# Patient Record
Sex: FEMALE | Race: WHITE | NOT HISPANIC OR LATINO | URBAN - METROPOLITAN AREA
[De-identification: names, ages, dates, MRNs, and addresses within clinical notes are randomized per-mention and may not be internally consistent; named-entity substitution may affect disease eponyms.]

---

## 2017-01-24 ENCOUNTER — IMPORTED ENCOUNTER (OUTPATIENT)
Dept: URBAN - METROPOLITAN AREA CLINIC 38 | Facility: CLINIC | Age: 57
End: 2017-01-24

## 2017-01-24 PROBLEM — H25.813 COMBINED FORMS OF AGE-RELATED CATARACT, BILATERAL: Noted: 2017-01-24

## 2017-01-24 PROCEDURE — 92015 DETERMINE REFRACTIVE STATE: CPT

## 2017-01-24 PROCEDURE — 92014 COMPRE OPH EXAM EST PT 1/>: CPT

## 2017-01-30 ENCOUNTER — IMPORTED ENCOUNTER (OUTPATIENT)
Dept: URBAN - METROPOLITAN AREA CLINIC 38 | Facility: CLINIC | Age: 57
End: 2017-01-30

## 2017-01-30 PROBLEM — H25.813 COMBINED FORMS OF AGE-RELATED CATARACT, BILATERAL: Noted: 2017-01-30

## 2017-01-30 PROCEDURE — 92136 OPHTHALMIC BIOMETRY: CPT

## 2017-03-02 ENCOUNTER — IMPORTED ENCOUNTER (OUTPATIENT)
Dept: URBAN - METROPOLITAN AREA CLINIC 38 | Facility: CLINIC | Age: 57
End: 2017-03-02

## 2017-03-03 ENCOUNTER — IMPORTED ENCOUNTER (OUTPATIENT)
Dept: URBAN - METROPOLITAN AREA CLINIC 38 | Facility: CLINIC | Age: 57
End: 2017-03-03

## 2017-03-06 ENCOUNTER — IMPORTED ENCOUNTER (OUTPATIENT)
Dept: URBAN - METROPOLITAN AREA CLINIC 38 | Facility: CLINIC | Age: 57
End: 2017-03-06

## 2017-03-06 PROBLEM — H10.31 UNSPECIFIED ACUTE CONJUNCTIVITIS, RIGHT EYE: Noted: 2017-03-06

## 2017-03-06 PROCEDURE — 92012 INTRM OPH EXAM EST PATIENT: CPT

## 2017-03-13 ENCOUNTER — IMPORTED ENCOUNTER (OUTPATIENT)
Dept: URBAN - METROPOLITAN AREA CLINIC 38 | Facility: CLINIC | Age: 57
End: 2017-03-13

## 2017-03-13 PROCEDURE — 92136 OPHTHALMIC BIOMETRY: CPT

## 2017-04-06 ENCOUNTER — IMPORTED ENCOUNTER (OUTPATIENT)
Dept: URBAN - METROPOLITAN AREA CLINIC 38 | Facility: CLINIC | Age: 57
End: 2017-04-06

## 2017-04-07 ENCOUNTER — IMPORTED ENCOUNTER (OUTPATIENT)
Dept: URBAN - METROPOLITAN AREA CLINIC 38 | Facility: CLINIC | Age: 57
End: 2017-04-07

## 2017-04-24 ENCOUNTER — IMPORTED ENCOUNTER (OUTPATIENT)
Dept: URBAN - METROPOLITAN AREA CLINIC 38 | Facility: CLINIC | Age: 57
End: 2017-04-24

## 2017-07-31 ENCOUNTER — IMPORTED ENCOUNTER (OUTPATIENT)
Dept: URBAN - METROPOLITAN AREA CLINIC 38 | Facility: CLINIC | Age: 57
End: 2017-07-31

## 2017-07-31 PROBLEM — H26.493 POSTERIOR CAPSULE OPACITY -     OU: Noted: 2017-07-31

## 2017-07-31 PROCEDURE — 92012 INTRM OPH EXAM EST PATIENT: CPT

## 2017-12-19 ENCOUNTER — IMPORTED ENCOUNTER (OUTPATIENT)
Dept: URBAN - METROPOLITAN AREA CLINIC 38 | Facility: CLINIC | Age: 57
End: 2017-12-19

## 2017-12-19 PROBLEM — H52.4 PRESBYOPIA: Noted: 2017-12-19

## 2017-12-19 PROCEDURE — 92015 DETERMINE REFRACTIVE STATE: CPT

## 2019-01-07 ENCOUNTER — IMPORTED ENCOUNTER (OUTPATIENT)
Dept: URBAN - METROPOLITAN AREA CLINIC 38 | Facility: CLINIC | Age: 59
End: 2019-01-07

## 2019-01-07 PROBLEM — H26.493 POSTERIOR CAPSULE OPACITY -     OU: Noted: 2019-01-07

## 2019-01-07 PROBLEM — H52.4 PRESBYOPIA: Noted: 2019-01-07

## 2019-01-07 PROCEDURE — 92014 COMPRE OPH EXAM EST PT 1/>: CPT

## 2019-01-07 PROCEDURE — 92015 DETERMINE REFRACTIVE STATE: CPT

## 2020-01-20 ENCOUNTER — IMPORTED ENCOUNTER (OUTPATIENT)
Dept: URBAN - METROPOLITAN AREA CLINIC 38 | Facility: CLINIC | Age: 60
End: 2020-01-20

## 2020-01-20 PROBLEM — H43.813 VITREOUS DEGENERATION, BILATERAL: Noted: 2020-01-20

## 2020-01-20 PROBLEM — H26.493 POSTERIOR CAPSULE OPACITY -     OU: Noted: 2020-01-20

## 2020-01-20 PROCEDURE — 92014 COMPRE OPH EXAM EST PT 1/>: CPT

## 2020-01-20 PROCEDURE — 92015 DETERMINE REFRACTIVE STATE: CPT

## 2020-09-29 ENCOUNTER — IMPORTED ENCOUNTER (OUTPATIENT)
Dept: URBAN - METROPOLITAN AREA CLINIC 38 | Facility: CLINIC | Age: 60
End: 2020-09-29

## 2020-09-29 PROBLEM — H26.493 POSTERIOR CAPSULE OPACITY -     OU: Noted: 2020-09-29

## 2020-09-29 PROCEDURE — 92012 INTRM OPH EXAM EST PATIENT: CPT

## 2020-11-09 ENCOUNTER — IMPORTED ENCOUNTER (OUTPATIENT)
Dept: URBAN - METROPOLITAN AREA CLINIC 38 | Facility: CLINIC | Age: 60
End: 2020-11-09

## 2020-11-09 PROBLEM — H26.493 POSTERIOR CAPSULE OPACITY -     OU: Noted: 2020-11-09

## 2020-11-09 PROCEDURE — 66821 AFTER CATARACT LASER SURGERY: CPT

## 2021-03-11 ENCOUNTER — IMPORTED ENCOUNTER (OUTPATIENT)
Dept: URBAN - METROPOLITAN AREA CLINIC 38 | Facility: CLINIC | Age: 61
End: 2021-03-11

## 2021-03-11 PROBLEM — H26.492 POSTERIOR CAPSULE OPACITY -   OS: Noted: 2021-03-11

## 2021-03-11 PROCEDURE — 92014 COMPRE OPH EXAM EST PT 1/>: CPT

## 2021-06-24 ENCOUNTER — COMPLETE SKIN EXAM (OUTPATIENT)
Dept: URBAN - METROPOLITAN AREA CLINIC 24 | Facility: CLINIC | Age: 61
Setting detail: DERMATOLOGY
End: 2021-06-24

## 2021-06-24 DIAGNOSIS — Z85.828 PERSONAL HISTORY OF OTHER MALIGNANT NEOPLASM OF SKIN: ICD-10-CM

## 2021-06-24 DIAGNOSIS — L73.9 FOLLICULAR DISORDER, UNSPECIFIED: ICD-10-CM

## 2021-06-24 DIAGNOSIS — L90.5 SCAR CONDITIONS AND FIBROSIS OF SKIN: ICD-10-CM

## 2021-06-24 DIAGNOSIS — L81.2 FRECKLES: ICD-10-CM

## 2021-06-24 PROCEDURE — 99213 OFFICE O/P EST LOW 20 MIN: CPT

## 2021-08-02 ENCOUNTER — RX ONLY (RX ONLY)
Age: 61
End: 2021-08-02

## 2021-08-02 RX ORDER — FLUTICASONE PROPIONATE 0.5 MG/G
1 AS DIRECTED CREAM TOPICAL TWICE A DAY
Qty: 30 | Refills: 2
Start: 2021-08-02

## 2021-09-08 ENCOUNTER — IMPORTED ENCOUNTER (OUTPATIENT)
Dept: URBAN - METROPOLITAN AREA CLINIC 38 | Facility: CLINIC | Age: 61
End: 2021-09-08

## 2021-09-08 PROBLEM — H00.14 CHALAZION OF LEFT UPPER EYELID: Noted: 2021-09-08

## 2021-09-08 PROCEDURE — 92012 INTRM OPH EXAM EST PATIENT: CPT

## 2022-03-22 ENCOUNTER — IMPORTED ENCOUNTER (OUTPATIENT)
Dept: URBAN - METROPOLITAN AREA CLINIC 38 | Facility: CLINIC | Age: 62
End: 2022-03-22

## 2022-03-22 PROBLEM — H26.492 POSTERIOR CAPSULE OPACITY: Noted: 2022-03-22

## 2022-03-22 PROBLEM — H26.492 POSTERIOR CAPSULE OPACITY -   OS: Noted: 2022-03-22

## 2022-03-22 PROCEDURE — 92014 COMPRE OPH EXAM EST PT 1/>: CPT

## 2022-03-22 PROCEDURE — 92015 DETERMINE REFRACTIVE STATE: CPT

## 2022-06-03 ASSESSMENT — VISUAL ACUITY
OS_BAT: 20/100
OS_CC: 20/20
OD_BAT: 20/20-
OS_BAT: 20/20
OS_CC: 20/20
OD_BAT: 20/400
OS_CC: 20/30
OD_CC: J2
OD_CC: J1
OD_SC: J1
OD_CC: 20/20
OD_CC: 20/20
OS_CC: J1
OD_CC: J2
OS_SC: 20/20-
OS_CC: 20/20
OD_CC: 20/30-1
OS_BAT: 20/20
OD_CC: J1
OS_SC: 20/20-1
OS_CC: J3
OS_CC: 20/20
OD_CC: 20/25+1
OD_CC: 20/400
OS_CC: J1
OS_CC: 20/40
OS_SC: 20/40
OD_CC: 20/20
OD_CC: 20/400
OS_CC: J1
OS_CC: 20/20
OS_SC: J1
OD_CC: J1
OD_SC: 20/20
OD_CC: 20/30
OD_CC: J1
OS_BAT: 20/50
OD_CC: 20/20
OD_SC: 20/20-1
OD_CC: J1
OS_CC: 20/30
OS_CC: J1
OS_CC: 20/20
OD_SC: 20/25
OD_SC: J5
OD_BAT: 20/40
OS_CC: 20/20
OS_BAT: <20/400
OD_SC: 20/20-1
OS_BAT: 20/20
OS_CC: 20/20
OD_SC: 20/25
OD_CC: 20/20
OS_CC: J1
OS_CC: 20/40
OD_CC: 20/20
OS_CC: J2
OS_CC: 20/20-1

## 2022-06-03 ASSESSMENT — KERATOMETRY
OD_AXISANGLE_DEGREES: 170
OS_K1POWER_DIOPTERS: 40.00
OD_K2POWER_DIOPTERS: 40.50
OS_AXISANGLE2_DEGREES: 122
OD_AXISANGLE2_DEGREES: 67
OS_AXISANGLE2_DEGREES: 99
OS_K1POWER_DIOPTERS: 40.50
OS_K2POWER_DIOPTERS: 41.25
OD_K1POWER_DIOPTERS: 39.50
OD_K2POWER_DIOPTERS: 40.50
OD_K1POWER_DIOPTERS: 39.75
OD_AXISANGLE2_DEGREES: 61
OS_AXISANGLE2_DEGREES: 98
OS_K2POWER_DIOPTERS: 41.50
OS_AXISANGLE2_DEGREES: 100
OS_AXISANGLE2_DEGREES: 100
OD_K1POWER_DIOPTERS: 39.50
OD_K2POWER_DIOPTERS: 40.25
OD_AXISANGLE2_DEGREES: 80
OD_K2POWER_DIOPTERS: 40.50
OS_K1POWER_DIOPTERS: 40.00
OD_AXISANGLE2_DEGREES: 56
OS_K2POWER_DIOPTERS: 41.00
OS_K2POWER_DIOPTERS: 41.25
OS_K2POWER_DIOPTERS: 40.75
OS_K1POWER_DIOPTERS: 40.25
OD_K2POWER_DIOPTERS: 40.25
OD_AXISANGLE2_DEGREES: 74
OS_K1POWER_DIOPTERS: 40.50
OS_AXISANGLE2_DEGREES: 102
OD_K2POWER_DIOPTERS: 40.00
OD_AXISANGLE_DEGREES: 163
OD_AXISANGLE2_DEGREES: 68
OS_AXISANGLE2_DEGREES: 98
OD_AXISANGLE_DEGREES: 160
OD_AXISANGLE_DEGREES: 158
OS_K2POWER_DIOPTERS: 41.00
OS_K2POWER_DIOPTERS: 41.50
OD_AXISANGLE_DEGREES: 168
OS_AXISANGLE2_DEGREES: 95
OS_AXISANGLE_DEGREES: 10
OS_AXISANGLE_DEGREES: 8
OD_K1POWER_DIOPTERS: 40.00
OS_AXISANGLE_DEGREES: 7
OS_AXISANGLE2_DEGREES: 97
OD_AXISANGLE_DEGREES: 164
OD_AXISANGLE2_DEGREES: 78
OD_AXISANGLE_DEGREES: 159
OD_K1POWER_DIOPTERS: 39.75
OS_AXISANGLE2_DEGREES: 101
OS_AXISANGLE_DEGREES: 32
OS_K1POWER_DIOPTERS: 40.00
OS_AXISANGLE_DEGREES: 5
OS_AXISANGLE_DEGREES: 4
OS_AXISANGLE_DEGREES: 12
OS_K1POWER_DIOPTERS: 40.25
OS_K2POWER_DIOPTERS: 41.25
OS_K2POWER_DIOPTERS: 41.25
OD_K2POWER_DIOPTERS: 40.25
OD_K2POWER_DIOPTERS: 40.50
OD_K1POWER_DIOPTERS: 39.75
OS_K1POWER_DIOPTERS: 40.75
OS_AXISANGLE_DEGREES: 9
OS_AXISANGLE2_DEGREES: 96
OS_K1POWER_DIOPTERS: 40.25
OS_AXISANGLE_DEGREES: 10
OD_K2POWER_DIOPTERS: 40.75
OS_K1POWER_DIOPTERS: 40.00
OS_AXISANGLE_DEGREES: 6
OD_K1POWER_DIOPTERS: 39.50
OD_K1POWER_DIOPTERS: 39.50
OD_AXISANGLE_DEGREES: 156
OD_AXISANGLE_DEGREES: 157
OD_AXISANGLE2_DEGREES: 70
OS_AXISANGLE2_DEGREES: 95
OD_K1POWER_DIOPTERS: 40.00
OD_AXISANGLE2_DEGREES: 66
OS_AXISANGLE2_DEGREES: 94
OD_K2POWER_DIOPTERS: 40.00
OD_AXISANGLE2_DEGREES: 69
OS_AXISANGLE_DEGREES: 8
OS_K2POWER_DIOPTERS: 41.25
OS_K2POWER_DIOPTERS: 41.50
OD_K2POWER_DIOPTERS: 40.50
OD_K1POWER_DIOPTERS: 39.75
OS_K1POWER_DIOPTERS: 40.25
OS_K1POWER_DIOPTERS: 40.00
OD_K1POWER_DIOPTERS: 39.75
OD_AXISANGLE_DEGREES: 151
OS_K2POWER_DIOPTERS: 40.75
OS_AXISANGLE_DEGREES: 5
OS_K1POWER_DIOPTERS: 40.25
OS_AXISANGLE_DEGREES: 11
OD_AXISANGLE_DEGREES: 146
OD_AXISANGLE2_DEGREES: 73
OD_AXISANGLE_DEGREES: 163
OD_AXISANGLE_DEGREES: 150
OD_K1POWER_DIOPTERS: 39.75
OD_AXISANGLE2_DEGREES: 73
OD_K2POWER_DIOPTERS: 40.50
OD_K1POWER_DIOPTERS: 40.00
OS_K2POWER_DIOPTERS: 41.25
OD_K2POWER_DIOPTERS: 40.50
OD_AXISANGLE2_DEGREES: 60

## 2022-06-03 ASSESSMENT — TONOMETRY
OS_IOP_MMHG: 12
OD_IOP_MMHG: 9
OD_IOP_MMHG: 10
OS_IOP_MMHG: 12
OD_IOP_MMHG: 9
OD_IOP_MMHG: 9
OS_IOP_MMHG: 9
OD_IOP_MMHG: 8
OD_IOP_MMHG: 10
OS_IOP_MMHG: 13
OS_IOP_MMHG: 9
OD_IOP_MMHG: 12
OD_IOP_MMHG: 9
OS_IOP_MMHG: 10
OS_IOP_MMHG: 10
OD_IOP_MMHG: 10
OS_IOP_MMHG: 11
OS_IOP_MMHG: 10
OS_IOP_MMHG: 10
OD_IOP_MMHG: 11
OS_IOP_MMHG: 10
OD_IOP_MMHG: 9
OD_IOP_MMHG: 10
OD_IOP_MMHG: 11
OD_IOP_MMHG: 10
OS_IOP_MMHG: 11
OS_IOP_MMHG: 11
OS_IOP_MMHG: 10

## 2022-10-03 ENCOUNTER — APPOINTMENT (OUTPATIENT)
Dept: URBAN - METROPOLITAN AREA CLINIC 193 | Age: 62
Setting detail: DERMATOLOGY
End: 2022-10-03

## 2022-10-03 DIAGNOSIS — L81.4 OTHER MELANIN HYPERPIGMENTATION: ICD-10-CM

## 2022-10-03 DIAGNOSIS — L82.1 OTHER SEBORRHEIC KERATOSIS: ICD-10-CM

## 2022-10-03 DIAGNOSIS — L98.8 OTHER SPECIFIED DISORDERS OF THE SKIN AND SUBCUTANEOUS TISSUE: ICD-10-CM

## 2022-10-03 DIAGNOSIS — L30.4 ERYTHEMA INTERTRIGO: ICD-10-CM

## 2022-10-03 DIAGNOSIS — D22 MELANOCYTIC NEVI: ICD-10-CM

## 2022-10-03 DIAGNOSIS — L57.8 OTHER SKIN CHANGES DUE TO CHRONIC EXPOSURE TO NONIONIZING RADIATION: ICD-10-CM

## 2022-10-03 DIAGNOSIS — Z71.89 OTHER SPECIFIED COUNSELING: ICD-10-CM

## 2022-10-03 PROBLEM — D22.61 MELANOCYTIC NEVI OF RIGHT UPPER LIMB, INCLUDING SHOULDER: Status: ACTIVE | Noted: 2022-10-03

## 2022-10-03 PROCEDURE — OTHER PRESCRIPTION MEDICATION MANAGEMENT: OTHER

## 2022-10-03 PROCEDURE — OTHER PRESCRIPTION: OTHER

## 2022-10-03 PROCEDURE — 99213 OFFICE O/P EST LOW 20 MIN: CPT

## 2022-10-03 PROCEDURE — OTHER COUNSELING: OTHER

## 2022-10-03 RX ORDER — TRETIONIN 0.25 MG/G
CREAM TOPICAL
Qty: 45 | Refills: 3 | Status: ERX | COMMUNITY
Start: 2022-10-03

## 2022-10-03 ASSESSMENT — LOCATION SIMPLE DESCRIPTION DERM
LOCATION SIMPLE: RIGHT UPPER ARM
LOCATION SIMPLE: CHEST
LOCATION SIMPLE: LEFT PRETIBIAL REGION
LOCATION SIMPLE: RIGHT CHEEK
LOCATION SIMPLE: LEFT CHEEK
LOCATION SIMPLE: LEFT ANTERIOR NECK

## 2022-10-03 ASSESSMENT — LOCATION DETAILED DESCRIPTION DERM
LOCATION DETAILED: RIGHT SUPERIOR LATERAL MALAR CHEEK
LOCATION DETAILED: LEFT PROXIMAL PRETIBIAL REGION
LOCATION DETAILED: RIGHT ANTERIOR PROXIMAL UPPER ARM
LOCATION DETAILED: LEFT INFERIOR ANTERIOR NECK
LOCATION DETAILED: LEFT MEDIAL SUPERIOR CHEST
LOCATION DETAILED: LEFT SUPERIOR CENTRAL MALAR CHEEK

## 2022-10-03 ASSESSMENT — LOCATION ZONE DERM
LOCATION ZONE: LEG
LOCATION ZONE: TRUNK
LOCATION ZONE: NECK
LOCATION ZONE: ARM
LOCATION ZONE: FACE

## 2022-10-03 NOTE — PROCEDURE: PRESCRIPTION MEDICATION MANAGEMENT
Continue Regimen: Fluticasone 0.05% cream BID, Loprox cream,zeasorb powder for maintenance
Render In Strict Bullet Format?: No
Detail Level: Zone

## 2022-10-03 NOTE — HPI: EVALUATION OF SKIN LESION(S)
What Type Of Note Output Would You Prefer (Optional)?: Standard Output
Hpi Title: Evaluation of Skin Lesions
Additional History: \\n\\nPatient reports she thinks she got an insect bite on her right breast, the scab resolved but she still has a red linda.

## 2023-04-13 ENCOUNTER — ESTABLISHED COMPREHENSIVE EXAM (OUTPATIENT)
Dept: URBAN - METROPOLITAN AREA CLINIC 68 | Facility: CLINIC | Age: 63
End: 2023-04-13

## 2023-04-13 DIAGNOSIS — Z96.1: ICD-10-CM

## 2023-04-13 DIAGNOSIS — H26.492: ICD-10-CM

## 2023-04-13 PROCEDURE — 92015 DETERMINE REFRACTIVE STATE: CPT

## 2023-04-13 PROCEDURE — 92014 COMPRE OPH EXAM EST PT 1/>: CPT

## 2023-04-13 ASSESSMENT — TONOMETRY
OD_IOP_MMHG: 12
OS_IOP_MMHG: 13

## 2023-04-13 ASSESSMENT — VISUAL ACUITY
OS_GLARE: 20/25
OD_CC: 20/20
OS_CC: 20/20
OS_CC: J1
OD_CC: J1

## 2023-04-13 ASSESSMENT — KERATOMETRY
OS_AXISANGLE2_DEGREES: 98
OD_AXISANGLE_DEGREES: 126
OD_K2POWER_DIOPTERS: 40.75
OS_K2POWER_DIOPTERS: 41.00
OD_K1POWER_DIOPTERS: 40.00
OS_K1POWER_DIOPTERS: 40.25
OS_AXISANGLE_DEGREES: 008
OD_AXISANGLE2_DEGREES: 36

## 2023-10-03 ENCOUNTER — APPOINTMENT (OUTPATIENT)
Dept: URBAN - METROPOLITAN AREA CLINIC 193 | Age: 63
Setting detail: DERMATOLOGY
End: 2023-10-04

## 2023-10-03 DIAGNOSIS — L82.1 OTHER SEBORRHEIC KERATOSIS: ICD-10-CM

## 2023-10-03 DIAGNOSIS — L81.4 OTHER MELANIN HYPERPIGMENTATION: ICD-10-CM

## 2023-10-03 DIAGNOSIS — Z71.89 OTHER SPECIFIED COUNSELING: ICD-10-CM

## 2023-10-03 DIAGNOSIS — L57.8 OTHER SKIN CHANGES DUE TO CHRONIC EXPOSURE TO NONIONIZING RADIATION: ICD-10-CM

## 2023-10-03 DIAGNOSIS — D22 MELANOCYTIC NEVI: ICD-10-CM

## 2023-10-03 DIAGNOSIS — L85.3 XEROSIS CUTIS: ICD-10-CM

## 2023-10-03 PROBLEM — D22.61 MELANOCYTIC NEVI OF RIGHT UPPER LIMB, INCLUDING SHOULDER: Status: ACTIVE | Noted: 2023-10-03

## 2023-10-03 PROCEDURE — OTHER MEDICATION COUNSELING: OTHER

## 2023-10-03 PROCEDURE — OTHER PRESCRIPTION: OTHER

## 2023-10-03 PROCEDURE — 99213 OFFICE O/P EST LOW 20 MIN: CPT

## 2023-10-03 PROCEDURE — OTHER COUNSELING: OTHER

## 2023-10-03 RX ORDER — DESONIDE 0.5 MG/G
OINTMENT TOPICAL
Qty: 15 | Refills: 1 | Status: ERX | COMMUNITY
Start: 2023-10-03

## 2023-10-03 ASSESSMENT — LOCATION SIMPLE DESCRIPTION DERM
LOCATION SIMPLE: LEFT ANTERIOR NECK
LOCATION SIMPLE: LEFT PRETIBIAL REGION
LOCATION SIMPLE: CHEST
LOCATION SIMPLE: RIGHT UPPER ARM

## 2023-10-03 ASSESSMENT — LOCATION ZONE DERM
LOCATION ZONE: LEG
LOCATION ZONE: TRUNK
LOCATION ZONE: ARM
LOCATION ZONE: NECK

## 2023-10-03 ASSESSMENT — LOCATION DETAILED DESCRIPTION DERM
LOCATION DETAILED: LEFT INFERIOR ANTERIOR NECK
LOCATION DETAILED: LEFT MEDIAL SUPERIOR CHEST
LOCATION DETAILED: LEFT PROXIMAL PRETIBIAL REGION
LOCATION DETAILED: RIGHT ANTERIOR PROXIMAL UPPER ARM

## 2023-10-03 NOTE — PROCEDURE: COUNSELING
Detail Level: Generalized
Detail Level: Detailed
Patient Specific Counseling (Will Not Stick From Patient To Patient): Patient has been biting her lip causing her lip not to be able to heal. Advised to apply Desonide BID x two weeks and keep area lubricated with Aquaphor.

## 2023-10-03 NOTE — PROCEDURE: MEDICATION COUNSELING
Discharge Instructions  Vomiting and Diarrhea in Children    Your child was seen today for an illness with vomiting and/or diarrhea. At this time, your doctor feels that there is no sign that your child s symptoms are due to a serious or life-threatening condition, and your child does not appear severely dehydrated. However, sometimes there is a more serious illness that doesn t show up right away, and you need to watch your child at home and return as directed. Also, we will ask you to do all you can to keep your child from getting dehydrated, and to watch for signs of dehydration.    Return to the Emergency Department if:  Your child seems to get sicker, won t wake up, won t respond normally, or is crying for a long time and won t calm down.  Your child seems to have very bad abdominal pain, has blood in the stool (which may look red, maroon, or black like tar), or vomits bloody or black material.  Your child is showing signs of dehydration.  Signs of dehydration can be:  Your infant has had no wet diapers in 4-5 hours.  Your older child has not passed urine in 6-8 hours.  Your infant or child starts to have dry mouth and lips, or no saliva or tears.  Your child is very pale, seems very tired, or has sunken eyes.  Your child passes out or faints.  Your child has any new symptoms.   You notice anything else that worries you.    Note about dehydration:  The safest and best way to stop dehydration or to treat mild dehydration is by drinking fluids. The instructions below will usually help stop the need for an IV or a stay in the hospital. This takes a lot of time and effort for the parent, but is best for your child. You need to stick with it, and may need to really encourage your child!  You should give your child Pedialyte , or another oral rehydration solution.  You can also make your own oral rehydration solution at home with this recipe:  one level teaspoon of salt.  eight level teaspoons of sugar.  5 measuring  cups of clean drinking water.   You need to give only small amounts of fluid at a time, but give it regularly. Start with about a teaspoon every 5 minutes.   If your child is not vomiting, slowly add to the amount given each time until you are giving at least this amount:  For a child under 2 years old  Between a quarter and a half of a large cup at a time. Your child should take at least 6 cups of solution per day.  For older children  Between a half and a whole large cup at a time. Your child should take at least 12 cups of solution per day.   As your child takes larger amounts each time, you may give the solution less often.   If your child vomits, stop giving the fluid for about 10 minutes, then start again with 1 teaspoon, or at least with a little less than last time.  As soon as your child is taking oral rehydration solution well, you can add mild solids (or formula for babies) in small amounts. Things like crackers, toast, and noodles are good choices. If your child vomits, stop the solids (or formula) for an hour or so. If your baby is breast fed, you may keep breastfeeding frequently.   If your child is doing well with mild solids, start adding more foods. Don t give spicy, greasy, or fried foods until the vomiting and diarrhea have stopped for a day or two.   If your child has really bad diarrhea, milk may give them gas and loose bowels for a few days.  Note: feeding your child more may make them have more diarrhea at first, but they will get better faster!    If your doctor today has told you to follow-up with your regular doctor, it is very important that you make an appointment with your clinic and go to that appointment.  If you do not follow-up with your primary doctor, it may result in missing an important development which could result in permanent injury or disability and/or lasting pain.  If there is any problem keeping your appointment, call your doctor or return to the Emergency  Department.    If you were given a prescription for medicine here today, be sure to read all of the information (including the package insert) that comes with your prescription.  This will include important information about the medicine, its side effects, and any warnings that you need to know about.  The pharmacist who fills the prescription can provide more information and answer questions you may have about the medicine.  If you have questions or concerns that the pharmacist cannot address, please call or return to the Emergency Department.     Opioid Medication Information    Pain medications are among the most commonly prescribed medicines, so we are including this information for all our patients. If you did not receive pain medication or get a prescription for pain medicine, you can ignore it.     You may have been given a prescription for an opioid (narcotic) pain medicine and/or have received a pain medicine while here in the Emergency Department. These medicines can make you drowsy or impaired. You must not drive, operate dangerous equipment, or engage in any other dangerous activities while taking these medications. If you drive while taking these medications, you could be arrested for DUI, or driving under the influence. Do not drink any alcohol while you are taking these medications.     Opioid pain medications can cause addiction. If you have a history of chemical dependency of any type, you are at a higher risk of becoming addicted to pain medications.  Only take these prescribed medications to treat your pain when all other options have been tried. Take it for as short a time and as few doses as possible. Store your pain pills in a secure place, as they are frequently stolen and provide a dangerous opportunity for children or visitors in your house to start abusing these powerful medications. We will not replace any lost or stolen medicine.  As soon as your pain is better, you should flush all your  remaining medication.     Many prescription pain medications contain Tylenol  (acetaminophen), including Vicodin , Tylenol #3 , Norco , Lortab , and Percocet .  You should not take any extra pills of Tylenol  if you are using these prescription medications or you can get very sick.  Do not ever take more than 3000 mg of acetaminophen in any 24 hour period.    All opioids tend to cause constipation. Drink plenty of water and eat foods that have a lot of fiber, such as fruits, vegetables, prune juice, apple juice and high fiber cereal.  Take a laxative if you don t move your bowels at least every other day. Miralax , Milk of Magnesia, Colace , or Senna  can be used to keep you regular.      Remember that you can always come back to the Emergency Department if you are not able to see your regular doctor in the amount of time listed above, if you get any new symptoms, or if there is anything that worries you.  Discharge Instructions  Fever in Children    Your child has been seen today for an illness with fever. At this time, your doctor finds no sign that your child s fever is due to a serious or life-threatening condition. However, sometimes there is a more serious illness that doesn t show up right away, and you need to watch your child at home and return as directed.     Return to the Emergency Department if:  Your child seems much more ill, won t wake up, won t respond right, or is crying for a long time and won t calm down.  Your child seems short of breath, such as breathing fast, struggling to breathe, having the chest pull in between the ribs or over the collar bones, or making wheezing sounds.  Your child is showing signs of dehydration. Signs of dehydration can be:  Your infant has had no wet diapers in 4-5 hours.  Your older child has not passed urine in 6-8 hours.  Your infant or child starts to have dry mouth and lips, or no saliva or tears.  Your child passes out or faints.  Your child has a convulsion or  "seizure.  Your child has any new symptoms, including a severe headache.   You notice anything else that worries you.    Note about Fever:  The fever that comes with an illness is not dangerous to your child and won t cause brain damage.   Any fever over 100.4 rectal in a child 3 months of age or younger means the child needs to be seen by a doctor. If this develops in your child, be sure you come back here or be seen right away by your doctor.  Your child will probably feel better if you keep the fever down with medication, like Tylenol  (acetaminophen), Motrin  (ibuprofen), or Nuprin  (ibuprofen).  The clothes your child has on and blankets won t make much difference in their fever, so it is okay to put your child in clothes appropriate for the weather, and let your child have blankets if they want them.  Your child needs more fluid when there is a fever, so be sure to give plenty of liquids.     Probiotics: If you have been given an antibiotic, you may want to also take a probiotic pill or eat yogurt with live cultures. Probiotics have \"good bacteria\" to help your intestines stay healthy. Studies have shown that probiotics help prevent diarrhea and other intestine problems (including C. diff infection) when you take antibiotics. You can buy these without a prescription in the pharmacy section of the store.     If your doctor today has told you to follow-up with your regular doctor, it is very important that you make an appointment with your clinic and go to the appointment.  If you do not follow-up with your primary doctor, it may result in missing an important development which could result in permanent injury or disability and/or lasting pain.  If there is any problem keeping your appointment, call your doctor or return to the Emergency Department.    If you were given a prescription for medicine here today, be sure to read all of the information (including the package insert) that comes with your prescription.  " This will include important information about the medicine, its side effects, and any warnings that you need to know about.  The pharmacist who fills the prescription can provide more information and answer questions you may have about the medicine.  If you have questions or concerns that the pharmacist cannot address, please call or return to the Emergency Department.     Opioid Medication Information    Pain medications are among the most commonly prescribed medicines, so we are including this information for all our patients. If you did not receive pain medication or get a prescription for pain medicine, you can ignore it.     You may have been given a prescription for an opioid (narcotic) pain medicine and/or have received a pain medicine while here in the Emergency Department. These medicines can make you drowsy or impaired. You must not drive, operate dangerous equipment, or engage in any other dangerous activities while taking these medications. If you drive while taking these medications, you could be arrested for DUI, or driving under the influence. Do not drink any alcohol while you are taking these medications.     Opioid pain medications can cause addiction. If you have a history of chemical dependency of any type, you are at a higher risk of becoming addicted to pain medications.  Only take these prescribed medications to treat your pain when all other options have been tried. Take it for as short a time and as few doses as possible. Store your pain pills in a secure place, as they are frequently stolen and provide a dangerous opportunity for children or visitors in your house to start abusing these powerful medications. We will not replace any lost or stolen medicine.  As soon as your pain is better, you should flush all your remaining medication.     Many prescription pain medications contain Tylenol  (acetaminophen), including Vicodin , Tylenol #3 , Norco , Lortab , and Percocet .  You should not  take any extra pills of Tylenol  if you are using these prescription medications or you can get very sick.  Do not ever take more than 3000 mg of acetaminophen in any 24 hour period.    All opioids tend to cause constipation. Drink plenty of water and eat foods that have a lot of fiber, such as fruits, vegetables, prune juice, apple juice and high fiber cereal.  Take a laxative if you don t move your bowels at least every other day. Miralax , Milk of Magnesia, Colace , or Senna  can be used to keep you regular.      Remember that you can always come back to the Emergency Department if you are not able to see your regular doctor in the amount of time listed above, if you get any new symptoms, or if there is anything that worries you.       Rinvoq Pregnancy And Lactation Text: Based on animal studies, Rinvoq may cause embryo-fetal harm when administered to pregnant women.  The medication should not be used in pregnancy.  Breastfeeding is not recommended during treatment and for 6 days after the last dose.

## 2023-11-17 ENCOUNTER — RX ONLY (RX ONLY)
Age: 63
End: 2023-11-17

## 2023-11-17 RX ORDER — CICLOPIROX OLAMINE 7.7 MG/G
CREAM TOPICAL QD
Qty: 90 | Refills: 3 | Status: ERX | COMMUNITY
Start: 2023-11-17

## 2024-02-08 ENCOUNTER — ESTABLISHED COMPREHENSIVE EXAM (OUTPATIENT)
Dept: URBAN - METROPOLITAN AREA CLINIC 68 | Facility: CLINIC | Age: 64
End: 2024-02-08

## 2024-02-08 DIAGNOSIS — Z96.1: ICD-10-CM

## 2024-02-08 DIAGNOSIS — I69.898: ICD-10-CM

## 2024-02-08 DIAGNOSIS — H26.492: ICD-10-CM

## 2024-02-08 PROCEDURE — 92014 COMPRE OPH EXAM EST PT 1/>: CPT

## 2024-02-08 ASSESSMENT — KERATOMETRY
OS_AXISANGLE_DEGREES: 005
OD_AXISANGLE2_DEGREES: 56
OD_AXISANGLE_DEGREES: 146
OS_K2POWER_DIOPTERS: 41.50
OS_K1POWER_DIOPTERS: 40.75
OD_K1POWER_DIOPTERS: 40.50
OS_AXISANGLE2_DEGREES: 95
OD_K2POWER_DIOPTERS: 41.00

## 2024-02-08 ASSESSMENT — VISUAL ACUITY
OD_CC: 20/20
OS_CC: 20/20
OD_GLARE: 20/20
OS_CC: J1
OD_CC: J1
OS_GLARE: 20/20

## 2024-02-08 ASSESSMENT — TONOMETRY
OS_IOP_MMHG: 10
OD_IOP_MMHG: 11

## 2024-12-10 ENCOUNTER — APPOINTMENT (OUTPATIENT)
Dept: URBAN - METROPOLITAN AREA CLINIC 193 | Age: 64
Setting detail: DERMATOLOGY
End: 2024-12-10

## 2024-12-10 DIAGNOSIS — L82.1 OTHER SEBORRHEIC KERATOSIS: ICD-10-CM

## 2024-12-10 DIAGNOSIS — Z71.89 OTHER SPECIFIED COUNSELING: ICD-10-CM

## 2024-12-10 DIAGNOSIS — D22 MELANOCYTIC NEVI: ICD-10-CM

## 2024-12-10 DIAGNOSIS — L71.0 PERIORAL DERMATITIS: ICD-10-CM

## 2024-12-10 DIAGNOSIS — L81.4 OTHER MELANIN HYPERPIGMENTATION: ICD-10-CM

## 2024-12-10 DIAGNOSIS — L57.8 OTHER SKIN CHANGES DUE TO CHRONIC EXPOSURE TO NONIONIZING RADIATION: ICD-10-CM

## 2024-12-10 PROBLEM — D22.61 MELANOCYTIC NEVI OF RIGHT UPPER LIMB, INCLUDING SHOULDER: Status: ACTIVE | Noted: 2024-12-10

## 2024-12-10 PROCEDURE — OTHER COUNSELING: OTHER

## 2024-12-10 PROCEDURE — 99213 OFFICE O/P EST LOW 20 MIN: CPT

## 2024-12-10 PROCEDURE — OTHER PRESCRIPTION: OTHER

## 2024-12-10 RX ORDER — TACROLIMUS 0.3 MG/G
OINTMENT TOPICAL QD
Qty: 30 | Refills: 1 | Status: ERX | COMMUNITY
Start: 2024-12-10

## 2024-12-10 ASSESSMENT — LOCATION DETAILED DESCRIPTION DERM
LOCATION DETAILED: LEFT PROXIMAL PRETIBIAL REGION
LOCATION DETAILED: LEFT MEDIAL SUPERIOR CHEST
LOCATION DETAILED: RIGHT MEDIAL SUPERIOR EYELID
LOCATION DETAILED: LEFT MEDIAL SUPERIOR EYELID
LOCATION DETAILED: RIGHT ANTERIOR PROXIMAL UPPER ARM
LOCATION DETAILED: LEFT INFERIOR ANTERIOR NECK

## 2024-12-10 ASSESSMENT — LOCATION SIMPLE DESCRIPTION DERM
LOCATION SIMPLE: CHEST
LOCATION SIMPLE: LEFT PRETIBIAL REGION
LOCATION SIMPLE: LEFT ANTERIOR NECK
LOCATION SIMPLE: RIGHT UPPER ARM
LOCATION SIMPLE: RIGHT SUPERIOR EYELID
LOCATION SIMPLE: LEFT SUPERIOR EYELID

## 2024-12-10 ASSESSMENT — LOCATION ZONE DERM
LOCATION ZONE: TRUNK
LOCATION ZONE: EYELID
LOCATION ZONE: NECK
LOCATION ZONE: LEG
LOCATION ZONE: ARM

## 2024-12-10 NOTE — PROCEDURE: COUNSELING
Detail Level: Generalized
Detail Level: Detailed
Patient Specific Counseling (Will Not Stick From Patient To Patient): Encouraged patients to keep cleansers/moisturizers around her eyes gentle
Detail Level: Simple